# Patient Record
Sex: MALE | Race: BLACK OR AFRICAN AMERICAN | NOT HISPANIC OR LATINO | Employment: OTHER | URBAN - METROPOLITAN AREA
[De-identification: names, ages, dates, MRNs, and addresses within clinical notes are randomized per-mention and may not be internally consistent; named-entity substitution may affect disease eponyms.]

---

## 2017-09-05 ENCOUNTER — HOSPITAL ENCOUNTER (INPATIENT)
Facility: HOSPITAL | Age: 77
LOS: 2 days | Discharge: HOME | DRG: 811 | End: 2017-09-07
Attending: EMERGENCY MEDICINE | Admitting: INTERNAL MEDICINE

## 2017-09-05 DIAGNOSIS — N17.9 AKI (ACUTE KIDNEY INJURY): ICD-10-CM

## 2017-09-05 DIAGNOSIS — D64.9 SYMPTOMATIC ANEMIA: ICD-10-CM

## 2017-09-05 DIAGNOSIS — I10 ESSENTIAL HYPERTENSION: ICD-10-CM

## 2017-09-05 DIAGNOSIS — I63.9 CEREBROVASCULAR ACCIDENT (CVA), UNSPECIFIED MECHANISM: ICD-10-CM

## 2017-09-05 DIAGNOSIS — I25.10 CORONARY ARTERY DISEASE, ANGINA PRESENCE UNSPECIFIED, UNSPECIFIED VESSEL OR LESION TYPE, UNSPECIFIED WHETHER NATIVE OR TRANSPLANTED HEART: ICD-10-CM

## 2017-09-05 DIAGNOSIS — D64.9 SYMPTOMATIC ANEMIA: Primary | ICD-10-CM

## 2017-09-05 DIAGNOSIS — E11.49 TYPE 2 DIABETES MELLITUS WITH OTHER NEUROLOGIC COMPLICATION, UNSPECIFIED LONG TERM INSULIN USE STATUS: ICD-10-CM

## 2017-09-05 PROBLEM — E11.9 DIABETES MELLITUS: Status: ACTIVE | Noted: 2017-09-05

## 2017-09-05 LAB
ABO + RH BLD: NORMAL
ALBUMIN SERPL BCP-MCNC: 3.2 G/DL
ALP SERPL-CCNC: 47 U/L
ALT SERPL W/O P-5'-P-CCNC: 14 U/L
ANION GAP SERPL CALC-SCNC: 8 MMOL/L
ANISOCYTOSIS BLD QL SMEAR: SLIGHT
AST SERPL-CCNC: 14 U/L
BASOPHILS # BLD AUTO: 0 K/UL
BASOPHILS NFR BLD: 0.2 %
BILIRUB SERPL-MCNC: 0.4 MG/DL
BLD GP AB SCN CELLS X3 SERPL QL: NORMAL
BUN SERPL-MCNC: 34 MG/DL
CALCIUM SERPL-MCNC: 8.9 MG/DL
CHLORIDE SERPL-SCNC: 111 MMOL/L
CO2 SERPL-SCNC: 22 MMOL/L
CREAT SERPL-MCNC: 1.5 MG/DL
DACRYOCYTES BLD QL SMEAR: ABNORMAL
DIFFERENTIAL METHOD: ABNORMAL
EOSINOPHIL # BLD AUTO: 0.1 K/UL
EOSINOPHIL NFR BLD: 2.1 %
ERYTHROCYTE [DISTWIDTH] IN BLOOD BY AUTOMATED COUNT: 17.3 %
EST. GFR  (AFRICAN AMERICAN): 51 ML/MIN/1.73 M^2
EST. GFR  (NON AFRICAN AMERICAN): 44 ML/MIN/1.73 M^2
FOLATE SERPL-MCNC: 18.9 NG/ML
GLUCOSE SERPL-MCNC: 195 MG/DL
HCT VFR BLD AUTO: 23.4 %
HGB BLD-MCNC: 7.6 G/DL
HYPOCHROMIA BLD QL SMEAR: ABNORMAL
LYMPHOCYTES # BLD AUTO: 1.2 K/UL
LYMPHOCYTES NFR BLD: 23.7 %
MAGNESIUM SERPL-MCNC: 1.5 MG/DL
MCH RBC QN AUTO: 24.4 PG
MCHC RBC AUTO-ENTMCNC: 32.6 G/DL
MCV RBC AUTO: 75 FL
MONOCYTES # BLD AUTO: 0.2 K/UL
MONOCYTES NFR BLD: 3.8 %
NEUTROPHILS # BLD AUTO: 3.5 K/UL
NEUTROPHILS NFR BLD: 70.2 %
OVALOCYTES BLD QL SMEAR: ABNORMAL
PLATELET # BLD AUTO: 131 K/UL
PLATELET BLD QL SMEAR: ABNORMAL
PMV BLD AUTO: 9.5 FL
POIKILOCYTOSIS BLD QL SMEAR: ABNORMAL
POTASSIUM SERPL-SCNC: 3.8 MMOL/L
PROT SERPL-MCNC: 6 G/DL
RBC # BLD AUTO: 3.13 M/UL
SCHISTOCYTES BLD QL SMEAR: ABNORMAL
SODIUM SERPL-SCNC: 141 MMOL/L
TSH SERPL DL<=0.005 MIU/L-ACNC: 0.61 UIU/ML
VIT B12 SERPL-MCNC: 1881 PG/ML
WBC # BLD AUTO: 5 K/UL

## 2017-09-05 PROCEDURE — 83540 ASSAY OF IRON: CPT

## 2017-09-05 PROCEDURE — 36415 COLL VENOUS BLD VENIPUNCTURE: CPT

## 2017-09-05 PROCEDURE — 86901 BLOOD TYPING SEROLOGIC RH(D): CPT

## 2017-09-05 PROCEDURE — 86920 COMPATIBILITY TEST SPIN: CPT

## 2017-09-05 PROCEDURE — 12000002 HC ACUTE/MED SURGE SEMI-PRIVATE ROOM

## 2017-09-05 PROCEDURE — 96365 THER/PROPH/DIAG IV INF INIT: CPT

## 2017-09-05 PROCEDURE — 99285 EMERGENCY DEPT VISIT HI MDM: CPT | Mod: 25

## 2017-09-05 PROCEDURE — 80053 COMPREHEN METABOLIC PANEL: CPT

## 2017-09-05 PROCEDURE — 93010 ELECTROCARDIOGRAM REPORT: CPT | Mod: ,,, | Performed by: INTERNAL MEDICINE

## 2017-09-05 PROCEDURE — 63600175 PHARM REV CODE 636 W HCPCS: Performed by: EMERGENCY MEDICINE

## 2017-09-05 PROCEDURE — 84443 ASSAY THYROID STIM HORMONE: CPT

## 2017-09-05 PROCEDURE — 96361 HYDRATE IV INFUSION ADD-ON: CPT

## 2017-09-05 PROCEDURE — 86900 BLOOD TYPING SEROLOGIC ABO: CPT

## 2017-09-05 PROCEDURE — 83735 ASSAY OF MAGNESIUM: CPT

## 2017-09-05 PROCEDURE — 99223 1ST HOSP IP/OBS HIGH 75: CPT | Mod: ,,, | Performed by: INTERNAL MEDICINE

## 2017-09-05 PROCEDURE — 82746 ASSAY OF FOLIC ACID SERUM: CPT

## 2017-09-05 PROCEDURE — 25000003 PHARM REV CODE 250: Performed by: EMERGENCY MEDICINE

## 2017-09-05 PROCEDURE — P9016 RBC LEUKOCYTES REDUCED: HCPCS

## 2017-09-05 PROCEDURE — 82607 VITAMIN B-12: CPT

## 2017-09-05 PROCEDURE — 85025 COMPLETE CBC W/AUTO DIFF WBC: CPT

## 2017-09-05 PROCEDURE — 93005 ELECTROCARDIOGRAM TRACING: CPT

## 2017-09-05 PROCEDURE — 25000003 PHARM REV CODE 250: Performed by: INTERNAL MEDICINE

## 2017-09-05 RX ORDER — METFORMIN HYDROCHLORIDE 1000 MG/1
1000 TABLET, FILM COATED, EXTENDED RELEASE ORAL
Status: ON HOLD | COMMUNITY
End: 2017-09-07

## 2017-09-05 RX ORDER — CLONIDINE HYDROCHLORIDE 0.1 MG/1
0.1 TABLET ORAL 2 TIMES DAILY
Status: DISCONTINUED | OUTPATIENT
Start: 2017-09-05 | End: 2017-09-05

## 2017-09-05 RX ORDER — FAMOTIDINE 10 MG/ML
20 INJECTION INTRAVENOUS NIGHTLY
Status: DISCONTINUED | OUTPATIENT
Start: 2017-09-05 | End: 2017-09-05

## 2017-09-05 RX ORDER — AMLODIPINE BESYLATE 10 MG/1
10 TABLET ORAL DAILY
Status: ON HOLD | COMMUNITY
End: 2017-09-07

## 2017-09-05 RX ORDER — FLUOXETINE 10 MG/1
10 CAPSULE ORAL DAILY
Status: ON HOLD | COMMUNITY
End: 2017-09-07

## 2017-09-05 RX ORDER — PANTOPRAZOLE SODIUM 40 MG/1
40 TABLET, DELAYED RELEASE ORAL DAILY
Status: DISCONTINUED | OUTPATIENT
Start: 2017-09-06 | End: 2017-09-06

## 2017-09-05 RX ORDER — FLUOXETINE 10 MG/1
10 CAPSULE ORAL DAILY
Status: DISCONTINUED | OUTPATIENT
Start: 2017-09-06 | End: 2017-09-07 | Stop reason: HOSPADM

## 2017-09-05 RX ORDER — CLOPIDOGREL BISULFATE 75 MG/1
75 TABLET ORAL DAILY
Status: ON HOLD | COMMUNITY
End: 2017-09-07

## 2017-09-05 RX ORDER — ASPIRIN 81 MG/1
81 TABLET ORAL DAILY
COMMUNITY

## 2017-09-05 RX ORDER — LISINOPRIL 10 MG/1
10 TABLET ORAL DAILY
Status: ON HOLD | COMMUNITY
End: 2017-09-07

## 2017-09-05 RX ORDER — CARVEDILOL 6.25 MG/1
12.5 TABLET ORAL 2 TIMES DAILY WITH MEALS
Status: DISCONTINUED | OUTPATIENT
Start: 2017-09-05 | End: 2017-09-07 | Stop reason: HOSPADM

## 2017-09-05 RX ORDER — ATORVASTATIN CALCIUM 10 MG/1
10 TABLET, FILM COATED ORAL DAILY
Status: ON HOLD | COMMUNITY
End: 2017-09-07

## 2017-09-05 RX ORDER — CLONIDINE HYDROCHLORIDE 0.1 MG/1
0.1 TABLET ORAL 2 TIMES DAILY
Status: ON HOLD | COMMUNITY
End: 2017-09-07

## 2017-09-05 RX ORDER — ATORVASTATIN CALCIUM 10 MG/1
10 TABLET, FILM COATED ORAL DAILY
Status: DISCONTINUED | OUTPATIENT
Start: 2017-09-06 | End: 2017-09-07 | Stop reason: HOSPADM

## 2017-09-05 RX ORDER — AMLODIPINE BESYLATE 5 MG/1
10 TABLET ORAL DAILY
Status: DISCONTINUED | OUTPATIENT
Start: 2017-09-06 | End: 2017-09-07 | Stop reason: HOSPADM

## 2017-09-05 RX ORDER — CARVEDILOL 12.5 MG/1
12.5 TABLET ORAL 2 TIMES DAILY WITH MEALS
Status: ON HOLD | COMMUNITY
End: 2017-09-07

## 2017-09-05 RX ORDER — SODIUM CHLORIDE 9 MG/ML
INJECTION, SOLUTION INTRAVENOUS CONTINUOUS
Status: DISCONTINUED | OUTPATIENT
Start: 2017-09-05 | End: 2017-09-07

## 2017-09-05 RX ORDER — MAGNESIUM SULFATE/D5W 1 G/50 ML
1 INTRAVENOUS SOLUTION, PIGGYBACK (ML) INTRAVENOUS
Status: COMPLETED | OUTPATIENT
Start: 2017-09-05 | End: 2017-09-05

## 2017-09-05 RX ORDER — LISINOPRIL 10 MG/1
10 TABLET ORAL DAILY
Status: DISCONTINUED | OUTPATIENT
Start: 2017-09-06 | End: 2017-09-07

## 2017-09-05 RX ORDER — HYDROCHLOROTHIAZIDE 25 MG/1
25 TABLET ORAL DAILY
Status: ON HOLD | COMMUNITY
End: 2017-09-07

## 2017-09-05 RX ORDER — FAMOTIDINE 10 MG/ML
20 INJECTION INTRAVENOUS EVERY 12 HOURS
Status: DISCONTINUED | OUTPATIENT
Start: 2017-09-05 | End: 2017-09-05

## 2017-09-05 RX ORDER — HYDROCODONE BITARTRATE AND ACETAMINOPHEN 500; 5 MG/1; MG/1
TABLET ORAL
Status: DISCONTINUED | OUTPATIENT
Start: 2017-09-05 | End: 2017-09-07 | Stop reason: HOSPADM

## 2017-09-05 RX ADMIN — Medication 1 G: at 04:09

## 2017-09-05 RX ADMIN — SODIUM CHLORIDE 500 ML: 0.9 INJECTION, SOLUTION INTRAVENOUS at 02:09

## 2017-09-05 RX ADMIN — SODIUM CHLORIDE: 0.9 INJECTION, SOLUTION INTRAVENOUS at 08:09

## 2017-09-05 RX ADMIN — CARVEDILOL 12.5 MG: 6.25 TABLET, FILM COATED ORAL at 10:09

## 2017-09-05 RX ADMIN — SODIUM CHLORIDE 250 ML: 0.9 INJECTION, SOLUTION INTRAVENOUS at 11:09
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
## 2017-09-06 ENCOUNTER — ANESTHESIA (OUTPATIENT)
Dept: ENDOSCOPY | Facility: HOSPITAL | Age: 77
DRG: 811 | End: 2017-09-06

## 2017-09-06 ENCOUNTER — ANESTHESIA EVENT (OUTPATIENT)
Dept: ENDOSCOPY | Facility: HOSPITAL | Age: 77
DRG: 811 | End: 2017-09-06

## 2017-09-06 VITALS — RESPIRATION RATE: 15 BRPM

## 2017-09-06 LAB
ANION GAP SERPL CALC-SCNC: 8 MMOL/L
ANISOCYTOSIS BLD QL SMEAR: SLIGHT
BASOPHILS # BLD AUTO: 0 K/UL
BASOPHILS NFR BLD: 0.4 %
BLD PROD TYP BPU: NORMAL
BLD PROD TYP BPU: NORMAL
BLOOD UNIT EXPIRATION DATE: NORMAL
BLOOD UNIT EXPIRATION DATE: NORMAL
BLOOD UNIT TYPE CODE: 6200
BLOOD UNIT TYPE CODE: 6200
BLOOD UNIT TYPE: NORMAL
BLOOD UNIT TYPE: NORMAL
BUN SERPL-MCNC: 27 MG/DL
CALCIUM SERPL-MCNC: 8.7 MG/DL
CHLORIDE SERPL-SCNC: 111 MMOL/L
CHOLEST SERPL-MCNC: 92 MG/DL
CHOLEST/HDLC SERPL: 2.9 {RATIO}
CK MB SERPL-MCNC: 1.9 NG/ML
CK MB SERPL-MCNC: 2 NG/ML
CK MB SERPL-MCNC: 2 NG/ML
CK MB SERPL-RTO: 1.4 %
CK SERPL-CCNC: 136 U/L
CK SERPL-CCNC: 139 U/L
CK SERPL-CCNC: 145 U/L
CO2 SERPL-SCNC: 22 MMOL/L
CODING SYSTEM: NORMAL
CODING SYSTEM: NORMAL
CREAT SERPL-MCNC: 1.1 MG/DL
DIFFERENTIAL METHOD: ABNORMAL
DISPENSE STATUS: NORMAL
DISPENSE STATUS: NORMAL
EOSINOPHIL # BLD AUTO: 0.1 K/UL
EOSINOPHIL NFR BLD: 2.2 %
ERYTHROCYTE [DISTWIDTH] IN BLOOD BY AUTOMATED COUNT: 17.7 %
EST. GFR  (AFRICAN AMERICAN): >60 ML/MIN/1.73 M^2
EST. GFR  (NON AFRICAN AMERICAN): >60 ML/MIN/1.73 M^2
GIANT PLATELETS BLD QL SMEAR: PRESENT
GLUCOSE SERPL-MCNC: 88 MG/DL
HCT VFR BLD AUTO: 31 %
HDLC SERPL-MCNC: 32 MG/DL
HDLC SERPL: 34.8 %
HGB BLD-MCNC: 10.3 G/DL
HYPOCHROMIA BLD QL SMEAR: ABNORMAL
IRON SERPL-MCNC: 59 UG/DL
LDLC SERPL CALC-MCNC: 44.6 MG/DL
LYMPHOCYTES # BLD AUTO: 1.9 K/UL
LYMPHOCYTES NFR BLD: 30 %
MCH RBC QN AUTO: 25.6 PG
MCHC RBC AUTO-ENTMCNC: 33 G/DL
MCV RBC AUTO: 78 FL
MONOCYTES # BLD AUTO: 0.3 K/UL
MONOCYTES NFR BLD: 5.4 %
NEUTROPHILS # BLD AUTO: 3.9 K/UL
NEUTROPHILS NFR BLD: 62 %
NONHDLC SERPL-MCNC: 60 MG/DL
NUM UNITS TRANS PACKED RBC: NORMAL
NUM UNITS TRANS PACKED RBC: NORMAL
OVALOCYTES BLD QL SMEAR: ABNORMAL
PLATELET # BLD AUTO: 122 K/UL
PLATELET BLD QL SMEAR: ABNORMAL
PMV BLD AUTO: 9.5 FL
POIKILOCYTOSIS BLD QL SMEAR: SLIGHT
POTASSIUM SERPL-SCNC: 3.8 MMOL/L
RBC # BLD AUTO: 4 M/UL
SATURATED IRON: 25 %
SODIUM SERPL-SCNC: 141 MMOL/L
TOTAL IRON BINDING CAPACITY: 238 UG/DL
TRANSFERRIN SERPL-MCNC: 161 MG/DL
TRIGL SERPL-MCNC: 77 MG/DL
TROPONIN I SERPL DL<=0.01 NG/ML-MCNC: <0.006 NG/ML
TROPONIN I SERPL DL<=0.01 NG/ML-MCNC: <0.006 NG/ML
WBC # BLD AUTO: 6.2 K/UL

## 2017-09-06 PROCEDURE — 85025 COMPLETE CBC W/AUTO DIFF WBC: CPT

## 2017-09-06 PROCEDURE — 63600175 PHARM REV CODE 636 W HCPCS: Performed by: NURSE ANESTHETIST, CERTIFIED REGISTERED

## 2017-09-06 PROCEDURE — 88305 TISSUE EXAM BY PATHOLOGIST: CPT | Performed by: PATHOLOGY

## 2017-09-06 PROCEDURE — 25000003 PHARM REV CODE 250: Performed by: INTERNAL MEDICINE

## 2017-09-06 PROCEDURE — 80061 LIPID PANEL: CPT

## 2017-09-06 PROCEDURE — 88342 IMHCHEM/IMCYTCHM 1ST ANTB: CPT | Mod: 26,,, | Performed by: PATHOLOGY

## 2017-09-06 PROCEDURE — D9220A PRA ANESTHESIA: Mod: ,,, | Performed by: ANESTHESIOLOGY

## 2017-09-06 PROCEDURE — 37000009 HC ANESTHESIA EA ADD 15 MINS: Performed by: INTERNAL MEDICINE

## 2017-09-06 PROCEDURE — 0DB68ZX EXCISION OF STOMACH, VIA NATURAL OR ARTIFICIAL OPENING ENDOSCOPIC, DIAGNOSTIC: ICD-10-PCS | Performed by: ANESTHESIOLOGY

## 2017-09-06 PROCEDURE — 82553 CREATINE MB FRACTION: CPT

## 2017-09-06 PROCEDURE — 84484 ASSAY OF TROPONIN QUANT: CPT | Mod: 91

## 2017-09-06 PROCEDURE — P9016 RBC LEUKOCYTES REDUCED: HCPCS

## 2017-09-06 PROCEDURE — 88305 TISSUE EXAM BY PATHOLOGIST: CPT | Mod: 26,,, | Performed by: PATHOLOGY

## 2017-09-06 PROCEDURE — 36415 COLL VENOUS BLD VENIPUNCTURE: CPT

## 2017-09-06 PROCEDURE — 84484 ASSAY OF TROPONIN QUANT: CPT

## 2017-09-06 PROCEDURE — 82553 CREATINE MB FRACTION: CPT | Mod: 91

## 2017-09-06 PROCEDURE — 43239 EGD BIOPSY SINGLE/MULTIPLE: CPT | Mod: ,,, | Performed by: INTERNAL MEDICINE

## 2017-09-06 PROCEDURE — 37000008 HC ANESTHESIA 1ST 15 MINUTES: Performed by: INTERNAL MEDICINE

## 2017-09-06 PROCEDURE — 43239 EGD BIOPSY SINGLE/MULTIPLE: CPT | Performed by: INTERNAL MEDICINE

## 2017-09-06 PROCEDURE — 99232 SBSQ HOSP IP/OBS MODERATE 35: CPT | Mod: ,,, | Performed by: INTERNAL MEDICINE

## 2017-09-06 PROCEDURE — 99232 SBSQ HOSP IP/OBS MODERATE 35: CPT | Mod: 25,,, | Performed by: INTERNAL MEDICINE

## 2017-09-06 PROCEDURE — 27201012 HC FORCEPS, HOT/COLD, DISP: Performed by: INTERNAL MEDICINE

## 2017-09-06 PROCEDURE — 80048 BASIC METABOLIC PNL TOTAL CA: CPT

## 2017-09-06 PROCEDURE — 12000002 HC ACUTE/MED SURGE SEMI-PRIVATE ROOM

## 2017-09-06 RX ORDER — PROPOFOL 10 MG/ML
INJECTION, EMULSION INTRAVENOUS
Status: DISCONTINUED
Start: 2017-09-06 | End: 2017-09-07 | Stop reason: HOSPADM

## 2017-09-06 RX ORDER — LIDOCAINE HCL/PF 100 MG/5ML
SYRINGE (ML) INTRAVENOUS
Status: DISCONTINUED | OUTPATIENT
Start: 2017-09-06 | End: 2017-09-06

## 2017-09-06 RX ORDER — PROPOFOL 10 MG/ML
VIAL (ML) INTRAVENOUS
Status: DISCONTINUED | OUTPATIENT
Start: 2017-09-06 | End: 2017-09-06

## 2017-09-06 RX ORDER — PANTOPRAZOLE SODIUM 40 MG/1
40 TABLET, DELAYED RELEASE ORAL 2 TIMES DAILY
Status: DISCONTINUED | OUTPATIENT
Start: 2017-09-06 | End: 2017-09-07 | Stop reason: HOSPADM

## 2017-09-06 RX ORDER — ASPIRIN 81 MG/1
81 TABLET ORAL DAILY
Status: DISCONTINUED | OUTPATIENT
Start: 2017-09-06 | End: 2017-09-07 | Stop reason: HOSPADM

## 2017-09-06 RX ORDER — LIDOCAINE HYDROCHLORIDE 20 MG/ML
INJECTION, SOLUTION EPIDURAL; INFILTRATION; INTRACAUDAL; PERINEURAL
Status: DISCONTINUED
Start: 2017-09-06 | End: 2017-09-07 | Stop reason: HOSPADM

## 2017-09-06 RX ORDER — CLOPIDOGREL BISULFATE 75 MG/1
75 TABLET ORAL DAILY
Status: DISCONTINUED | OUTPATIENT
Start: 2017-09-06 | End: 2017-09-07 | Stop reason: HOSPADM

## 2017-09-06 RX ADMIN — LIDOCAINE HYDROCHLORIDE 75 MG: 20 INJECTION, SOLUTION INTRAVENOUS at 11:09

## 2017-09-06 RX ADMIN — PROPOFOL 40 MG: 10 INJECTION, EMULSION INTRAVENOUS at 11:09

## 2017-09-06 RX ADMIN — PROPOFOL 80 MG: 10 INJECTION, EMULSION INTRAVENOUS at 11:09

## 2017-09-06 RX ADMIN — PANTOPRAZOLE SODIUM 40 MG: 40 TABLET, DELAYED RELEASE ORAL at 09:09

## 2017-09-06 RX ADMIN — ASPIRIN 81 MG: 81 TABLET, COATED ORAL at 03:09

## 2017-09-06 RX ADMIN — CLOPIDOGREL BISULFATE 75 MG: 75 TABLET ORAL at 03:09

## 2017-09-06 RX ADMIN — CARVEDILOL 12.5 MG: 6.25 TABLET, FILM COATED ORAL at 06:09

## 2017-09-06 NOTE — PLAN OF CARE
EGD- significant gastritis (firm on biopsy) with few gastric and duodenal erosions as well as duodenitis (biopsied). <1cm oblong submucosal mass in area of papilla that was non-bleeding and non-ulcerated, biopsied.    Recommendations:  -Follow up biopsy results  -PPI BID x 8 weeks then daily   -Refer for outpatient EUS and colonoscopy  -Patient's anemia is consistent with AOCD, though may in some part be exacerbated by chronic GI blood loss from gastritis. He has responded appropriately to pRBC transfusion.   -Ok to restart Aspirin and Plavix   -Ok for regular diet    Gertrude Li MD

## 2017-09-06 NOTE — TRANSFER OF CARE
"Anesthesia Transfer of Care Note    Patient: Aren Sanders    Procedure(s) Performed: Procedure(s) (LRB):  ESOPHAGOGASTRODUODENOSCOPY (EGD) (N/A)    Patient location: PACU    Anesthesia Type: general    Transport from OR: Transported from OR on room air with adequate spontaneous ventilation    Post pain: adequate analgesia    Post assessment: no apparent anesthetic complications and tolerated procedure well    Post vital signs: stable    Level of consciousness: awake, alert and oriented    Nausea/Vomiting: no nausea/vomiting    Complications: none    Transfer of care protocol was followed      Last vitals:   Visit Vitals  BP (!) 140/70   Pulse 64   Temp 36.3 °C (97.3 °F) (Skin)   Resp 19   Ht 5' 7" (1.702 m)   Wt 81.8 kg (180 lb 5.4 oz)   SpO2 100%   BMI 28.24 kg/m²     "
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
## 2017-09-06 NOTE — PLAN OF CARE
Problem: Patient Care Overview  Goal: Plan of Care Review  Outcome: Ongoing (interventions implemented as appropriate)  Plan of care explained to patient. Verbalized understanding and denies questions at present. Vital signs stable on room air. Blood consent noted. Pt verbalizes understanding of need for 2 units PRBC. Voiding per urinal. Pt remains free of falls. Side rails up x 2, Call light within reach. Advise to call for stand by assist while OOB, states understanding of instruction. All items within reach. Pt remains NPO awaiting GI consult. Pt aware of need to collect stool for occult blood. Enc. To call prn. Will monitor.

## 2017-09-06 NOTE — ANESTHESIA POSTPROCEDURE EVALUATION
"Anesthesia Post Evaluation    Patient: Aren Sanders    Procedure(s) Performed: Procedure(s) (LRB):  ESOPHAGOGASTRODUODENOSCOPY (EGD) (N/A)    Final Anesthesia Type: general  Patient location during evaluation: PACU  Patient participation: Yes- Able to Participate  Level of consciousness: awake and alert and oriented  Post-procedure vital signs: reviewed and stable  Pain management: adequate  Airway patency: patent  PONV status at discharge: No PONV  Anesthetic complications: no      Cardiovascular status: blood pressure returned to baseline  Respiratory status: unassisted, spontaneous ventilation and room air  Hydration status: euvolemic  Follow-up not needed.        Visit Vitals  BP (!) 140/70   Pulse 64   Temp 36.3 °C (97.3 °F) (Skin)   Resp 19   Ht 5' 7" (1.702 m)   Wt 81.8 kg (180 lb 5.4 oz)   SpO2 100%   BMI 28.24 kg/m²       Pain/Lou Score: Pain Assessment Performed: Yes (9/6/2017  8:00 AM)  Presence of Pain: denies (9/6/2017 11:03 AM)      "

## 2017-09-06 NOTE — PLAN OF CARE
Attempted to meet with pt to complete his assessment.  Pt was off the floor for endo.  CM will follow up later.     09/06/17 1100   Discharge Assessment   Assessment Type Discharge Planning Assessment

## 2017-09-06 NOTE — PLAN OF CARE
Problem: Patient Care Overview  Goal: Plan of Care Review  Patient underwent EGD today, tolerated well, cardiac diet maintained, no falls at this time, bed in the lowest and locked position, side rails up x2, call light in reach, will continue to monitor.

## 2017-09-06 NOTE — DISCHARGE INSTRUCTIONS
Gastritis (Adult)    Duodenitis    The duodenum is the first part of the small intestine, just past the stomach. Duodenitis is inflammation of the lining of the duodenum. This sheet tells you more about the condition.  Causes of duodenitis  The most common cause of duodenitis is infection by Helicobacter pylori (H. pylori) bacteria. Another common cause is long-term use of NSAIDs (such as aspirin and ibuprofen). Celiac disease, an allergy to gluten, causes a particular type of inflammation in the duodenum along with other changes. Less commonly, duodenitis happens along with another health problem, such as Crohn's disease. Drinking alcohol, smoking, or taking certain medicines also may make duodenitis more likely to happen.   Symptoms of duodenitis  The condition may cause no symptoms. If symptoms do happen, they can include:  · Burning, cramping, or hunger-like pain in your stomach  · Gas or a bloated feeling  · Nausea and vomiting  · Feeling full soon after starting a meal  Diagnosing duodenitis  Here is what will be done to diagnose duodenitis:  · If duodenitis is suspected, an upper endoscopy with biopsy will be done to confirm it. During this test, a thin, flexible tube with a light and camera on the end (endoscope) is used. The scope is moved down your throat to the stomach and into the duodenum. The scope sends images of the duodenum to a video screen. Small samples (biopsy) of the lining of the duodenum may be taken. These samples may be sent to a lab for testing for H. pylori.  · To check for H. pylori or other pathogens, a blood, stool, gastric biopsy, or breath test may be done. Samples of blood or stool are taken and tested in a lab. For a breath test, you swallow a harmless compound. If H. pylori bacteria are present, extra carbon dioxide gas can then be detected in your breath.  · To check for celiac disease, blood tests may be done. If positive, an upper endoscopy with biopsy is usually done to  confirm the diagnosis.   · In rare cases, an upper gastrointestinal (GI) series is done. This gives more information about the digestive tract. This procedure takes X-rays of the upper GI tract from the mouth to the small intestine.  Treating duodenitis  Duodenitis is treated using one or more of the following:  · Antibiotic medicines to kill H. pylori  · Medicines to reduce the amount of acid the stomach makes  · Stopping NSAIDs such as aspirin and ibuprofen. However, if you take aspirin for a medical condition, such as heart disease or stroke, do not stop until you check with your prescribing healthcare provider. If you take NSAIDs for arthritis or pain, check with your healthcare provider about alternatives.  · Adopting a gluten-free diet if celiac disease is the cause.  · Avoiding alcohol  · Stopping smoking  Your healthcare provider can tell you more about what treatments are needed.  Recovery and follow-up  With treatment, most cases of duodenitis clear up completely. In rare cases, duodenitis can be an ongoing (chronic) problem or can develop into a duodenal ulcer. If your symptoms do not improve or if they go away and come back, let your healthcare provider know. In such cases, regular healthcare provider visits and treatments are needed to manage your condition.   When to call the healthcare provider  Call your healthcare provider right away if you have any of the following:  · Fever of 100.4°F (38.0°C) or higher, or as advised by your healthcare provider  · Nausea or vomiting (vomit may be bloody or look like coffee grounds)  · Dark, tarry, or bloody stools  · Sudden or severe stomach pain  · Pain that does not improve with treatment  · Rapid weight loss   Date Last Reviewed: 7/1/2016  © 6625-9540 [employer]. [address] [place] [address], [place] [address]. All rights reserved. This information is not intended as a substitute for professional medical care. Always follow your healthcare  professional's instructions.        Gastritis is inflammation and irritation of the stomach lining. It can be present for a short time (acute) or be long lasting (chronic). Gastritis is often caused by infection with bacteria called H pylori. More than a third of people in the [place] have this bacteria in their bodies. In many cases, H pylori causes no problems or symptoms. In some people, though, the infection irritates the stomach lining and causes gastritis. Other causes of stomach irritation include drinking alcohol or taking pain-relieving medicines called NSAIDs (such as aspirin or ibuprofen).   Symptoms of gastritis can include:  · Abdominal pain or bloating  · Loss of appetite  · Nausea or vomiting  · Vomiting blood or having black stools  · Feeling more tired than usual  An inflamed and irritated stomach lining is more likely to develop a sore called an ulcer. To help prevent this, gastritis should be treated.  Home care  If needed, medicines may be prescribed. If you have H pylori infection, treating it will likely relieve your symptoms. Other changes can help reduce stomach irritation and help it heal.  · If you have been prescribed medicines for H pylori infection, take them as directed. Take all of the medicine until it is finished or your healthcare provider tells you to stop, even if you feel better.  · Your healthcare provider may recommend avoiding NSAIDs. If you take daily aspirin for your heart or other medical reasons, do not stop without talking to your healthcare provider first.  · Avoid drinking alcohol.  · Stop smoking. Smoking can irritate the stomach and delay healing. As much as possible, stay away from second hand smoke.  Follow-up care  Follow up with your healthcare provider, or as advised by our staff. Testing may be needed to check for inflammation or an ulcer.  When to seek medical advice  Call your healthcare provider for any of the following:  · Stomach pain that gets worse or moves to  the lower right abdomen (appendix area)  · Chest pain that appears or gets worse, or spreads to the back, neck, shoulder, or arm  · Frequent vomiting (cant keep down liquids)  · Blood in the stool or vomit (red or black in color)  · Feeling weak or dizzy  · Fever of 100.4ºF (38ºC) or higher, or as directed by your healthcare provider  Date Last Reviewed: 6/22/2015  © 3134-2050 [employer] [address] [place] [address]. All rights reserved. This information is not intended as a substitute for professional medical care. Always follow your healthcare professional's instructions.

## 2017-09-06 NOTE — ANESTHESIA PREPROCEDURE EVALUATION
09/06/2017  Aren Sanders is a 76 y.o., male.    Anesthesia Evaluation    I have reviewed the Patient Summary Reports.    I have reviewed the Nursing Notes.   I have reviewed the Medications.     Review of Systems  Cardiovascular:   Hypertension CAD      Renal/:   Chronic Renal Disease    Neurological:   CVA    Endocrine:   Diabetes, well controlled, type 2           Anesthesia Plan  Type of Anesthesia, risks & benefits discussed:  Anesthesia Type:  general  Patient's Preference:   Intra-op Monitoring Plan: standard ASA monitors  Intra-op Monitoring Plan Comments:   Post Op Pain Control Plan:   Post Op Pain Control Plan Comments:   Induction:   IV  Beta Blocker:  Patient is on a Beta-Blocker and has received one dose within the past 24 hours (No further documentation required).       Informed Consent: Patient understands risks and agrees with Anesthesia plan.  Questions answered. Anesthesia consent signed with patient.  ASA Score: 3     Day of Surgery Review of History & Physical: I have interviewed and examined the patient. I have reviewed the patient's H&P dated:  There are no significant changes.          Ready For Surgery From Anesthesia Perspective.

## 2017-09-06 NOTE — CONSULTS
[facility]     CC: Anemia    HPI 76 y.o. male with history of AMI on Aspirin and Plavix, CVA with residual RLE weakness, HTN (on multiple medications) and DM, found to have moderate, painless, microcytic anemia associated with recent weakness and hypotension, not associated with overt GI bleeding. He states at the age of 18 he was told his blood counts were low and her underwent both EGD and colonoscopy at that time, told he had a stomach ulcer that needed treatment with medications. He has no overt bleeding or blood in stool. He denies family history of GI malignancy or bleeding problems. He denies NSAID use other than daily Aspirin 81 mg for CAD. He does not have dysphagia, change in bowel habits, abdominal pain or heartburn.     Past Medical History:   Diagnosis Date    Coronary artery disease     Diabetes mellitus     Hypertension     Stroke        Surgical History:  None    Social History   Substance Use Topics    Smoking status: Never Smoker    Smokeless tobacco: Never Used    Alcohol use Not on file   -No illicits; Staying with nephew    Family History:  -No family history of gastric or colon cancer; no known family history of thalassemia or sickle cell     Review of Systems  General ROS: negative for - chills, fever or weight loss  Psychological ROS: negative for - hallucination, depression or suicidal ideation  Ophthalmic ROS: negative for - blurry vision, photophobia or eye pain  ENT ROS: negative for - epistaxis, sore throat or rhinorrhea  Respiratory ROS: no cough, shortness of breath, or wheezing  Cardiovascular ROS: no chest pain or dyspnea on exertion  Gastrointestinal ROS: no abdominal pain, no hematemesis, no blood in stool  Genito-Urinary ROS: no dysuria, trouble voiding, or hematuria  Musculoskeletal ROS: negative for - arthralgia, myalgia; + RLE weakness after CVA  Neurological ROS: no syncope or seizures; no ataxia; + h/o CVA with RLE weakness  Dermatological ROS: negative  "for pruritis, rash and jaundice    Physical Examination  BP (!) 188/76 (BP Location: Left arm, Patient Position: Lying)   Pulse 61   Temp 98.4 °F (36.9 °C) (Oral)   Resp 20   Ht 5' 7" (1.702 m)   Wt 81.8 kg (180 lb 5.4 oz)   SpO2 98%   BMI 28.24 kg/m²   General appearance: alert, cooperative, no distress  HENT: Normocephalic, atraumatic, neck symmetrical, no nasal discharge   Eyes: conjunctivae/corneas clear, PERRL, EOM's intact, sclera anicteric  Lungs: clear to auscultation bilaterally, no dullness to percussion bilaterally, symmetric expansion, breathing unlabored  Heart: regular rate and rhythm without rub; no displacement of the PMI   Abdomen: soft, nontender, nondistended, BS normoactive, no organomegaly appreciated   Extremities: extremities symmetric; no clubbing, cyanosis, or edema  Integument: Skin color, texture, turgor normal; no rashes; hair distrubution normal, no jaundice  Neurologic: Alert and oriented X 3, mild RLE weakness, no tremor  Psychiatric: no pressured speech; normal affect; no evidence of impaired cognition, no anxiety/depression     Labs:  Lab Results   Component Value Date    WBC 6.20 09/06/2017    HGB 10.3 (L) 09/06/2017    HCT 31.0 (L) 09/06/2017    MCV 78 (L) 09/06/2017     (L) 09/06/2017     -B12- 1881, TIBC- 238, Fe- 59, Fe sat- 25%    -Troponin <0.006    Admission- Hgb - 7.6    EKG (viewed by me)- bradycardia, T wave inversions    Assessment:   76 y.o. male with HTN, CAD with recent AMI (on Aspirin and Plavix) and CVA with residual weakness, presents with hypotension, found to have microcytic anemia without overt bleeding. His labs are consistent with AOCD.     Plan:  -Keep NPO  -EGD today  -Obtain previous laboratory records (ie recent blood counts)    Gertrude Li MD  [facility]  [address], [address]  [place] [address]  Office: [phone]  Fax: [phone]  "

## 2017-09-06 NOTE — PROGRESS NOTES
Famotidine therapy for Aren Sanders [id number] has been evaluated according to the pharmacy practice protocol.      Based on the patient's Estimated Creatinine Clearance: 41.6 mL/min (based on SCr of 1.5 mg/dL (H))., famotidine therapy has been adjusted to 20 mg once daily.    Thank you,  Jose Khan, PharmD

## 2017-09-06 NOTE — PLAN OF CARE
Pt oriented to perioperative routine. Pt verbalized understanding. States being apprehensive re: procedure and sedation. Reassurance provided, questions encouraged and answered.

## 2017-09-07 ENCOUNTER — TELEPHONE (OUTPATIENT)
Dept: GASTROENTEROLOGY | Facility: CLINIC | Age: 77
End: 2017-09-07

## 2017-09-07 VITALS
WEIGHT: 180.31 LBS | HEIGHT: 67 IN | HEART RATE: 66 BPM | SYSTOLIC BLOOD PRESSURE: 155 MMHG | DIASTOLIC BLOOD PRESSURE: 80 MMHG | RESPIRATION RATE: 18 BRPM | OXYGEN SATURATION: 100 % | TEMPERATURE: 97 F | BODY MASS INDEX: 28.3 KG/M2

## 2017-09-07 DIAGNOSIS — K31.89 GASTRIC NODULE: Primary | ICD-10-CM

## 2017-09-07 LAB
ANION GAP SERPL CALC-SCNC: 7 MMOL/L
BASOPHILS # BLD AUTO: 0 K/UL
BASOPHILS NFR BLD: 0.4 %
BUN SERPL-MCNC: 21 MG/DL
CALCIUM SERPL-MCNC: 8.7 MG/DL
CHLORIDE SERPL-SCNC: 109 MMOL/L
CO2 SERPL-SCNC: 24 MMOL/L
CREAT SERPL-MCNC: 1.2 MG/DL
DIFFERENTIAL METHOD: ABNORMAL
EOSINOPHIL # BLD AUTO: 0.2 K/UL
EOSINOPHIL NFR BLD: 2.6 %
ERYTHROCYTE [DISTWIDTH] IN BLOOD BY AUTOMATED COUNT: 17 %
EST. GFR  (AFRICAN AMERICAN): >60 ML/MIN/1.73 M^2
EST. GFR  (NON AFRICAN AMERICAN): 58 ML/MIN/1.73 M^2
GLUCOSE SERPL-MCNC: 86 MG/DL
HCT VFR BLD AUTO: 33.2 %
HGB BLD-MCNC: 11 G/DL
LYMPHOCYTES # BLD AUTO: 2 K/UL
LYMPHOCYTES NFR BLD: 28.6 %
MCH RBC QN AUTO: 25.9 PG
MCHC RBC AUTO-ENTMCNC: 33 G/DL
MCV RBC AUTO: 78 FL
MONOCYTES # BLD AUTO: 0.4 K/UL
MONOCYTES NFR BLD: 6.3 %
NEUTROPHILS # BLD AUTO: 4.3 K/UL
NEUTROPHILS NFR BLD: 62.1 %
PLATELET # BLD AUTO: 150 K/UL
PMV BLD AUTO: 10.5 FL
POTASSIUM SERPL-SCNC: 3.8 MMOL/L
RBC # BLD AUTO: 4.24 M/UL
SODIUM SERPL-SCNC: 140 MMOL/L
WBC # BLD AUTO: 6.9 K/UL

## 2017-09-07 PROCEDURE — 99232 SBSQ HOSP IP/OBS MODERATE 35: CPT | Mod: 25,,, | Performed by: INTERNAL MEDICINE

## 2017-09-07 PROCEDURE — 25000003 PHARM REV CODE 250: Performed by: INTERNAL MEDICINE

## 2017-09-07 PROCEDURE — 36430 TRANSFUSION BLD/BLD COMPNT: CPT

## 2017-09-07 PROCEDURE — 80048 BASIC METABOLIC PNL TOTAL CA: CPT

## 2017-09-07 PROCEDURE — 36415 COLL VENOUS BLD VENIPUNCTURE: CPT

## 2017-09-07 PROCEDURE — 99239 HOSP IP/OBS DSCHRG MGMT >30: CPT | Mod: ,,, | Performed by: INTERNAL MEDICINE

## 2017-09-07 PROCEDURE — 85025 COMPLETE CBC W/AUTO DIFF WBC: CPT

## 2017-09-07 RX ORDER — METFORMIN HYDROCHLORIDE 1000 MG/1
1000 TABLET, FILM COATED, EXTENDED RELEASE ORAL
Qty: 30 TABLET | Refills: 0 | Status: SHIPPED | OUTPATIENT
Start: 2017-09-07

## 2017-09-07 RX ORDER — FLUOXETINE 10 MG/1
10 CAPSULE ORAL DAILY
Qty: 30 CAPSULE | Refills: 0 | Status: SHIPPED | OUTPATIENT
Start: 2017-09-07

## 2017-09-07 RX ORDER — ATORVASTATIN CALCIUM 10 MG/1
10 TABLET, FILM COATED ORAL DAILY
Qty: 30 TABLET | Refills: 0 | Status: SHIPPED | OUTPATIENT
Start: 2017-09-07

## 2017-09-07 RX ORDER — AMLODIPINE BESYLATE 10 MG/1
10 TABLET ORAL DAILY
Qty: 30 TABLET | Refills: 0 | Status: SHIPPED | OUTPATIENT
Start: 2017-09-07

## 2017-09-07 RX ORDER — LISINOPRIL 10 MG/1
20 TABLET ORAL DAILY
Status: DISCONTINUED | OUTPATIENT
Start: 2017-09-08 | End: 2017-09-07 | Stop reason: HOSPADM

## 2017-09-07 RX ORDER — LISINOPRIL 10 MG/1
20 TABLET ORAL DAILY
Qty: 30 TABLET | Refills: 0 | Status: SHIPPED | OUTPATIENT
Start: 2017-09-07

## 2017-09-07 RX ORDER — PANTOPRAZOLE SODIUM 40 MG/1
40 TABLET, DELAYED RELEASE ORAL 2 TIMES DAILY
Qty: 60 TABLET | Refills: 0 | Status: SHIPPED | OUTPATIENT
Start: 2017-09-07 | End: 2018-09-07

## 2017-09-07 RX ORDER — HYDROCHLOROTHIAZIDE 25 MG/1
25 TABLET ORAL DAILY
Qty: 30 TABLET | Refills: 0 | Status: SHIPPED | OUTPATIENT
Start: 2017-09-07

## 2017-09-07 RX ORDER — CLONIDINE HYDROCHLORIDE 0.1 MG/1
0.1 TABLET ORAL 2 TIMES DAILY
Qty: 60 TABLET | Refills: 0 | Status: SHIPPED | OUTPATIENT
Start: 2017-09-07

## 2017-09-07 RX ORDER — CLOPIDOGREL BISULFATE 75 MG/1
75 TABLET ORAL DAILY
Qty: 30 TABLET | Refills: 0 | Status: SHIPPED | OUTPATIENT
Start: 2017-09-07

## 2017-09-07 RX ORDER — CARVEDILOL 12.5 MG/1
12.5 TABLET ORAL 2 TIMES DAILY WITH MEALS
Qty: 60 TABLET | Refills: 0 | Status: SHIPPED | OUTPATIENT
Start: 2017-09-07

## 2017-09-07 RX ADMIN — ASPIRIN 81 MG: 81 TABLET, COATED ORAL at 09:09

## 2017-09-07 RX ADMIN — ATORVASTATIN CALCIUM 10 MG: 10 TABLET, FILM COATED ORAL at 09:09

## 2017-09-07 RX ADMIN — PANTOPRAZOLE SODIUM 40 MG: 40 TABLET, DELAYED RELEASE ORAL at 09:09

## 2017-09-07 RX ADMIN — CARVEDILOL 12.5 MG: 6.25 TABLET, FILM COATED ORAL at 09:09

## 2017-09-07 RX ADMIN — AMLODIPINE BESYLATE 10 MG: 5 TABLET ORAL at 09:09

## 2017-09-07 RX ADMIN — FLUOXETINE 10 MG: 10 CAPSULE ORAL at 09:09

## 2017-09-07 RX ADMIN — CLOPIDOGREL BISULFATE 75 MG: 75 TABLET ORAL at 09:09

## 2017-09-07 RX ADMIN — LISINOPRIL 10 MG: 10 TABLET ORAL at 09:09

## 2017-09-07 NOTE — PLAN OF CARE
Problem: Patient Care Overview  Goal: Plan of Care Review  Outcome: Ongoing (interventions implemented as appropriate)  Pt ambulatory with assist, using bathroom in bedside urinal. Cardiac diet. Weakness in right lower extremity. IVF d/c. Plan of care reviewed with patient, understanding verbalized.
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
## 2017-09-07 NOTE — PROGRESS NOTES
"[facility] Note    CC: Anemia    HPI 76 y.o. male with history of AMI on Aspirin and Plavix, CVA with residual RLE weakness, HTN (on multiple medications) and DM, found to have microcytic anemia (AOCD) without overt bleeding. EGD yesterday showed significant gastritis and submucosal mass at papilla, both biopsied. He feels much improved today, is eating a regular lunch and anticipating being discharged .    Past Medical History:   Diagnosis Date    Coronary artery disease     Diabetes mellitus     Hypertension     Stroke          Review of Systems  General ROS: negative for - chills, fever or weight loss  Cardiovascular ROS: no chest pain or dyspnea on exertion  Gastrointestinal ROS: no vomiting, no blood in stool, no abdominal pain    Physical Examination  BP (!) 152/67   Pulse (!) 54   Temp 98.9 °F (37.2 °C) (Oral)   Resp 18   Ht 5' 7" (1.702 m)   Wt 81.8 kg (180 lb 5.4 oz)   SpO2 98%   BMI 28.24 kg/m²   General appearance: alert, cooperative, no distress  HENT: Normocephalic, atraumatic, neck symmetrical, no nasal discharge, sclera anicteric   Lungs: clear to auscultation bilaterally, symmetric chest wall expansion bilaterally  Heart: regular rate and rhythm without rub; no displacement of the PMI   Abdomen: soft, nontender, nondistended, BS normoactive  Extremities: extremities symmetric; no clubbing, cyanosis, or edema        Labs:  Lab Results   Component Value Date    WBC 6.90 09/07/2017    HGB 11.0 (L) 09/07/2017    HCT 33.2 (L) 09/07/2017    MCV 78 (L) 09/07/2017     09/07/2017         Assessment:   76 y.o. male  with HTN, CAD with recent AMI (on Aspirin and Plavix) and CVA with residual weakness, admitted with hypotension and found to have microcytic anemia. EGD revealed significant gastritis (biospied) and mass at papilla (also biopsied). He is doing well, blood counts stable after transfusion.     Plan:  -Continue PPI BID x 8 weeks then daily  -Follow up biopsy " results  -Will arrange outpatient EUS and colonoscopy  -Agree with discharge today    Gertrude Li MD  [facility]  [address], [address]  [place] [address]  Office: [phone]  Fax: [phone]

## 2017-09-07 NOTE — TELEPHONE ENCOUNTER
----- Message from Emily Das LPN sent at 9/7/2017  4:04 PM CDT -----  Dr. Li would like for this man to have an EUS for duodenal papillary submucosal lesion. He is on plavix.

## 2017-09-07 NOTE — CONSULTS
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 The  will continue to care for this patient and family.

## 2017-09-08 ENCOUNTER — OUTPATIENT CASE MANAGEMENT (OUTPATIENT)
Dept: ADMINISTRATIVE | Facility: OTHER | Age: 77
End: 2017-09-08

## 2017-09-08 ENCOUNTER — PATIENT OUTREACH (OUTPATIENT)
Dept: ADMINISTRATIVE | Facility: CLINIC | Age: 77
End: 2017-09-08

## 2017-09-08 DIAGNOSIS — D64.9 ANEMIA, UNSPECIFIED TYPE: Primary | ICD-10-CM

## 2017-09-08 NOTE — PROGRESS NOTES
Thank you for the referral.  Patient has been assigned to [facility]  for low risk screening for Outpatient Case Management.     Reason for referral: Low risk    Anemia, unspecified type    Thank you,    Aparna Chase, SSC

## 2017-09-08 NOTE — PLAN OF CARE
09/08/17 0820   Final Note   Assessment Type Final Discharge Note   Discharge Disposition Home   Hospital Follow Up  Appt(s) scheduled? No  (pt was instructed to follow up with health unit/[facility] until he has insurance )   Discharge plans and expectations educations in teach back method with documentation complete? Yes

## 2017-09-08 NOTE — PROGRESS NOTES
This is an uninsured patient who is residing with family. C3 nurse referred patient to [facility] for SW services. [facility] # [phone]  [facility]- [phone] provided to caregiver

## 2017-09-08 NOTE — PATIENT INSTRUCTIONS

## 2017-09-11 ENCOUNTER — OUTPATIENT CASE MANAGEMENT (OUTPATIENT)
Dept: ADMINISTRATIVE | Facility: OTHER | Age: 77
End: 2017-09-11

## 2017-09-12 ENCOUNTER — OUTPATIENT CASE MANAGEMENT (OUTPATIENT)
Dept: ADMINISTRATIVE | Facility: OTHER | Age: 77
End: 2017-09-12

## 2017-09-12 NOTE — PROGRESS NOTES
This CSW received a referral on the above patient.   Reason for referral:Unisured  Name of the community resource that was provided:[facility] [employer] clinics, Advance Directives, [place]  Resource given to: Patient nephew via Us Mail and Telephone    CSW contacted patient . Patient gave verbal permission to nephew to act on his behalf. Patient lives with nephew.Carlos unable to live alone due to his current health conditions. Patient is pending Medicaid at this present time. CSW provided a list of 504 clinic resources. Caregiver reports patient is able to complete his ADL's. Caregiver reports patient receives 1440.00 in income every month. Caregiver reports patient is having difficulties affording medication. CSW sent an in basket referral to [facility] to assist with medication affordability. CSW mailed all resources and provided her contact information for any additional needs.

## 2017-09-19 ENCOUNTER — TELEPHONE (OUTPATIENT)
Dept: GASTROENTEROLOGY | Facility: CLINIC | Age: 77
End: 2017-09-19

## 2017-09-19 NOTE — TELEPHONE ENCOUNTER
----- Message from Gertrude Li MD sent at 9/12/2017 10:00 AM CDT -----  Please let patient know his gastric biopsies are negative for H.pylori and that he should continue taking acid medicine daily. He should come to clinic with me to schedule outpatient colonoscopy as well as discuss referral to [facility] for EUS.

## 2017-09-19 NOTE — TELEPHONE ENCOUNTER
Path report given to pt.and needs to make an appt.with doctor to come to the office to discuss referral to [facility]. States he will call back tomorrow.

## 2017-09-28 ENCOUNTER — TELEPHONE (OUTPATIENT)
Dept: PHARMACY | Facility: CLINIC | Age: 77
End: 2017-09-28

## 2017-10-06 ENCOUNTER — TELEPHONE (OUTPATIENT)
Dept: ENDOSCOPY | Facility: HOSPITAL | Age: 77
End: 2017-10-06

## 2017-10-06 ENCOUNTER — NURSE TRIAGE (OUTPATIENT)
Dept: ADMINISTRATIVE | Facility: CLINIC | Age: 77
End: 2017-10-06

## 2017-10-06 NOTE — TELEPHONE ENCOUNTER
Reason for Disposition   Age > 70 years    Protocols used: ST DIARRHEA-A-OH    Spoke to Mr. Donohue, patient's nephew. He states Mr. Sanders experiencing diarrhea overnight. He states he may have passed 3 stools. He states patient is not present with him at this time. Mr. Sanders is uninsured with no PCP. Home care advice given with information for a community resource.

## 2017-10-06 NOTE — TELEPHONE ENCOUNTER
Dr Gertrude Li, there is a case request for [facility] ([facility])  for Dx gastric nodule.  From note on 9/19/17 it looks like you intend to have a clinic appt with patient to discuss this referral for EUS.  Should we continue to wait to schedule EUS until that clinic with you appt occurs?  Thank you.

## 2017-10-09 ENCOUNTER — TELEPHONE (OUTPATIENT)
Dept: ENDOSCOPY | Facility: HOSPITAL | Age: 77
End: 2017-10-09

## 2017-10-10 ENCOUNTER — OUTPATIENT CASE MANAGEMENT (OUTPATIENT)
Dept: ADMINISTRATIVE | Facility: OTHER | Age: 77
End: 2017-10-10

## 2017-10-10 NOTE — PROGRESS NOTES
CSW received [facility] basket [facility] to contact patient nephalonso Mercer regarding insurance and housing issues. CSW attempted to contact patient nephew no answer. CSW left a message for a return call.

## 2017-10-11 ENCOUNTER — TELEPHONE (OUTPATIENT)
Dept: ENDOSCOPY | Facility: HOSPITAL | Age: 77
End: 2017-10-11

## 2017-10-11 NOTE — PROGRESS NOTES
CSW received a follow up call from patient nephew Mr. Donohue. Mr Donohue reports patient has been denied for Medicaid and patient has been hard to deal with . CSW ask Mr. Donohue did Medicaid provide a reason why patient was denied. Mr. Donohue reported he was unable to provide  information to Medicaid such as: Bank Statements etc.thats why case was denied.  CSW informed Mr Donohue to contact  to verify if case is still open. If case is still open please provide information. CSW advised Mr. Donohue patient will need Medicaid long term care to pay for NH  Service. 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  Mr. Donohue reports patient was in a NH facility, however he was ask to leave for non payment. Mr Donohue reports patient was denied for Medicaid due  of too much money in bank. Mr. Donohue reports patient still has a bill from [facility]. CSW mailed a senior resource guide to Mr. Donohue for NH resources. Mr. Donohue has this CSW contact information for any additional needs.

## 2017-10-11 NOTE — TELEPHONE ENCOUNTER
Spoke with patient about need to schedule UEUS as requested by Dr Gertrude Li.  Per his request, attempted call to nephRuslan gupta, and left voicemail requesting call back.

## 2017-10-19 ENCOUNTER — TELEPHONE (OUTPATIENT)
Dept: ENDOSCOPY | Facility: HOSPITAL | Age: 77
End: 2017-10-19

## 2017-10-19 ENCOUNTER — TELEPHONE (OUTPATIENT)
Dept: GASTROENTEROLOGY | Facility: CLINIC | Age: 77
End: 2017-10-19

## 2017-10-19 NOTE — TELEPHONE ENCOUNTER
The scheduling nurse has tried to contact the patient. He spoke with the patient once, who told him to call his nephew. Several message have been left for the nephew with no return call. Also, please see case management note. Looks like he may not have insurance. Do you want to try to refer to [facility]?

## 2017-10-19 NOTE — TELEPHONE ENCOUNTER
Attempted call to patient's nephew, Ruslan, to schedule UEUS.  Left voicemail requesting call back.

## 2017-10-20 NOTE — TELEPHONE ENCOUNTER
Message   Received: Today   Message Contents   MD Kira Nivees MA; P Guillermo Loredo Staff   Caller: Unspecified (Yesterday,  2:29 PM)             I think that would probably be best (to refer to [facility]). I will try to contact him next week.     Thanks for your help   Gertrude
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
